# Patient Record
Sex: FEMALE | Race: BLACK OR AFRICAN AMERICAN | NOT HISPANIC OR LATINO | Employment: FULL TIME | URBAN - METROPOLITAN AREA
[De-identification: names, ages, dates, MRNs, and addresses within clinical notes are randomized per-mention and may not be internally consistent; named-entity substitution may affect disease eponyms.]

---

## 2019-05-08 ENCOUNTER — OCCUPATIONAL HEALTH (OUTPATIENT)
Dept: URGENT CARE | Facility: CLINIC | Age: 29
End: 2019-05-08

## 2019-05-08 DIAGNOSIS — Z02.1 PRE-EMPLOYMENT EXAMINATION: Primary | ICD-10-CM

## 2021-07-29 DIAGNOSIS — U07.1 COVID-19 VIRUS DETECTED: ICD-10-CM

## 2021-08-03 ENCOUNTER — NURSE TRIAGE (OUTPATIENT)
Dept: ADMINISTRATIVE | Facility: CLINIC | Age: 31
End: 2021-08-03

## 2023-02-20 ENCOUNTER — TELEPHONE (OUTPATIENT)
Dept: HEMATOLOGY/ONCOLOGY | Facility: CLINIC | Age: 33
End: 2023-02-20
Payer: COMMERCIAL

## 2023-02-22 ENCOUNTER — OFFICE VISIT (OUTPATIENT)
Dept: SURGERY | Facility: CLINIC | Age: 33
End: 2023-02-22
Payer: COMMERCIAL

## 2023-02-22 VITALS
BODY MASS INDEX: 42.14 KG/M2 | HEART RATE: 68 BPM | DIASTOLIC BLOOD PRESSURE: 58 MMHG | TEMPERATURE: 99 F | HEIGHT: 62 IN | SYSTOLIC BLOOD PRESSURE: 114 MMHG | WEIGHT: 229 LBS

## 2023-02-22 DIAGNOSIS — N64.4 PAIN OF LEFT BREAST: Primary | ICD-10-CM

## 2023-02-22 PROCEDURE — 3078F DIAST BP <80 MM HG: CPT | Mod: CPTII,S$GLB,, | Performed by: SURGERY

## 2023-02-22 PROCEDURE — 1160F RVW MEDS BY RX/DR IN RCRD: CPT | Mod: CPTII,S$GLB,, | Performed by: SURGERY

## 2023-02-22 PROCEDURE — 1159F MED LIST DOCD IN RCRD: CPT | Mod: CPTII,S$GLB,, | Performed by: SURGERY

## 2023-02-22 PROCEDURE — 99203 PR OFFICE/OUTPT VISIT, NEW, LEVL III, 30-44 MIN: ICD-10-PCS | Mod: S$GLB,,, | Performed by: SURGERY

## 2023-02-22 PROCEDURE — 1159F PR MEDICATION LIST DOCUMENTED IN MEDICAL RECORD: ICD-10-PCS | Mod: CPTII,S$GLB,, | Performed by: SURGERY

## 2023-02-22 PROCEDURE — 99203 OFFICE O/P NEW LOW 30 MIN: CPT | Mod: S$GLB,,, | Performed by: SURGERY

## 2023-02-22 PROCEDURE — 99999 PR PBB SHADOW E&M-EST. PATIENT-LVL III: CPT | Mod: PBBFAC,,, | Performed by: SURGERY

## 2023-02-22 PROCEDURE — 3074F PR MOST RECENT SYSTOLIC BLOOD PRESSURE < 130 MM HG: ICD-10-PCS | Mod: CPTII,S$GLB,, | Performed by: SURGERY

## 2023-02-22 PROCEDURE — 3078F PR MOST RECENT DIASTOLIC BLOOD PRESSURE < 80 MM HG: ICD-10-PCS | Mod: CPTII,S$GLB,, | Performed by: SURGERY

## 2023-02-22 PROCEDURE — 3008F PR BODY MASS INDEX (BMI) DOCUMENTED: ICD-10-PCS | Mod: CPTII,S$GLB,, | Performed by: SURGERY

## 2023-02-22 PROCEDURE — 3008F BODY MASS INDEX DOCD: CPT | Mod: CPTII,S$GLB,, | Performed by: SURGERY

## 2023-02-22 PROCEDURE — 1160F PR REVIEW ALL MEDS BY PRESCRIBER/CLIN PHARMACIST DOCUMENTED: ICD-10-PCS | Mod: CPTII,S$GLB,, | Performed by: SURGERY

## 2023-02-22 PROCEDURE — 3074F SYST BP LT 130 MM HG: CPT | Mod: CPTII,S$GLB,, | Performed by: SURGERY

## 2023-02-22 PROCEDURE — 99999 PR PBB SHADOW E&M-EST. PATIENT-LVL III: ICD-10-PCS | Mod: PBBFAC,,, | Performed by: SURGERY

## 2023-02-22 NOTE — LETTER
February 22, 2023    Armand Carmona  [address] [facility] [address]         [facility]  [address] [place] [address]  Phone: [phone]  Fax: [phone] February 22, 2023     Patient: Armand Carmona   YOB: 1990   Date of Visit: 2/22/2023       To Whom It May Concern:    Please excuse Ms. Carmona from work on 2/22/2023 as she was under Dr. Barrera's care in clinic. It is my medical opinion that Armand Carmona may return to work on 2/23/2023.    If you have any questions or concerns, please don't hesitate to call.    Sincerely,      Kadie Arevalo RN   Breast Surgery

## 2023-02-22 NOTE — PROGRESS NOTES
Breast Surgery  [facility]  Department of Surgery      REFERRING PROVIDER:  Emergency department    Chief Complaint: Breast Pain (New Patient Left Breast Pain .)      Subjective:      Patient ID: Armand Carmona is a 32 y.o. female who presents with 1 week of left breast pain. Describes the pain as an intermittent throbbing just below her left nipple. Denies any trauma to the area. Denies any discharge from either nipple. Denies skin changes and or masses. Has never had a previous biopsy or breast imaging.    GYN History:  Age of menarche was 10. Premenopausal, LMP January.  Patient denies hormonal therapy. Patient is . Age of first live birth was 18. Family history includes maternal uncle with stomach cancer (60) and maternal uncle with head and neck cancer (67).     Past Medical History:   Diagnosis Date    Fibroid     Shortness of breath      Past Surgical History:   Procedure Laterality Date     SECTION      CHOLECYSTECTOMY      TONSILLECTOMY       Current Outpatient Medications on File Prior to Visit   Medication Sig Dispense Refill    albuterol (PROVENTIL/VENTOLIN HFA) 90 mcg/actuation inhaler Inhale 1-2 puffs into the lungs every 6 (six) hours as needed for Wheezing. Rescue 6.7 g 0    famotidine (PEPCID) 20 MG tablet Take 1 tablet (20 mg total) by mouth 2 (two) times daily. 20 tablet 0    ibuprofen (ADVIL,MOTRIN) 600 MG tablet Take 1 tablet (600 mg total) by mouth every 6 (six) hours as needed for Pain. 20 tablet 0    norgestimate-ethinyl estradiol (ORTHO-CYCLEN) 0.25-35 mg-mcg per tablet Take 1 tablet by mouth once daily. 30 tablet 3     No current facility-administered medications on file prior to visit.     Social History     Socioeconomic History    Marital status: Single   Tobacco Use    Smoking status: Never    Smokeless tobacco: Never   Substance and Sexual Activity    Alcohol use: Yes     Comment: social    Drug use: No    Sexual activity: Yes     Partners: Male     Birth  "control/protection: None     Family History   Problem Relation Age of Onset    Asthma Mother     Hypertension Mother     Thyroid disease Mother     Diabetes Father     Hypertension Father     Arthritis Father     Cancer Sister     No Known Problems Brother         Review of Systems   All other systems reviewed and are negative.  Objective:   BP (!) 114/58 (BP Location: Right arm, Patient Position: Sitting, BP Method: Large (Automatic))   Pulse 68   Temp 98.8 °F (37.1 °C) (Oral)   Ht 5' 2" (1.575 m)   Wt 103.9 kg (229 lb)   LMP 02/15/2023   BMI 41.88 kg/m²     Physical Exam   Vitals reviewed.  Constitutional: She is oriented to person, place, and time.   HENT:   Head: Normocephalic.   Cardiovascular:  Normal rate.      Exam reveals no gallop.       No murmur heard.  Pulmonary/Chest: Effort normal. No respiratory distress. She has no wheezes. Right breast exhibits no inverted nipple, no mass, no nipple discharge, no skin change and no tenderness. Left breast exhibits no inverted nipple, no mass, no nipple discharge, no skin change and no tenderness.   L breast TTP just below L nipple    Abdominal: Normal appearance.   Lymphadenopathy:     She has no cervical adenopathy.        Right: No supraclavicular adenopathy present.        Left: No supraclavicular adenopathy present.   Neurological: She is alert and oriented to person, place, and time.   Skin: Skin is warm and dry.     Psychiatric: Her behavior is normal. Mood normal.     Radiology review: Images personally reviewed by me in the clinic and shown to the patient during the consultation.     Assessment:       1. Pain of left breast          Plan:     There were no signs of mass or infection at the site of the breast pain.  No concerning changes on exam.  Patient has never had breast imaging of this area.  Recommend diagnostic breast imaging to assess for underlying cause of the breast pain.    We discussed breast pain in detail.  Breast pain is the most " common complaint in women have about the breast.  If no underlying cause of the breast pain is seen on imaging then the pain may be multif factorial.   We discussed that breast pain can often be associated to changes in hormones and can characterized either cyclical or noncyclical.    We discussed other nonhormone related causes of breast pain.  These include any injuries to the skin or muscles in the region as well as pain from any injury the breast itself may experience.    Treatment for her breast pain is as follows:  General recommendations:   I recommend she wear a well fitted bra.  When exercising a sports bra is recommended.   For pain relief she can take Tylenol or ibuprofen as recommended on the bottle.   Other recommendations:   Some women have found certain changes in their diet may be helpful to decrease the breast pain.  This topic is not well studied in the literature but patients have anatomically reported improvement of their pain with these dietary changes.  Caffeine has been thought to be a trigger in some cases of breast pain.  She may try eliminating caffeine from her diet to see if this improves the breast pain.  A diet high in fat has also been found to be associated with increased breast pain in certain patients.  She may attempt decreasing the fat in her diet to see if she has any improvement in her pain.    Current plan:  Return to clinic for diagnostic breast imaging.  We will visit after the imaging to discuss potential etiologies of the pain if not are found on the imaging.    30 minutes were spent on this encounter, 25 of which was face to face counseling and 5 minutes were spent on chart review and coordination of care.

## 2023-02-23 ENCOUNTER — TELEPHONE (OUTPATIENT)
Dept: SURGERY | Facility: CLINIC | Age: 33
End: 2023-02-23
Payer: COMMERCIAL

## 2023-02-27 ENCOUNTER — HOSPITAL ENCOUNTER (OUTPATIENT)
Dept: RADIOLOGY | Facility: HOSPITAL | Age: 33
Discharge: HOME | End: 2023-02-27
Attending: SURGERY
Payer: COMMERCIAL

## 2023-02-27 DIAGNOSIS — N64.4 PAIN OF LEFT BREAST: ICD-10-CM

## 2023-02-27 PROCEDURE — 77062 MAMMO DIGITAL DIAGNOSTIC BILAT WITH TOMO: ICD-10-PCS | Mod: 26,,, | Performed by: RADIOLOGY

## 2023-02-27 PROCEDURE — 77062 BREAST TOMOSYNTHESIS BI: CPT | Mod: TC

## 2023-02-27 PROCEDURE — 77066 MAMMO DIGITAL DIAGNOSTIC BILAT WITH TOMO: ICD-10-PCS | Mod: 26,,, | Performed by: RADIOLOGY

## 2023-02-27 PROCEDURE — 77066 DX MAMMO INCL CAD BI: CPT | Mod: 26,,, | Performed by: RADIOLOGY

## 2023-02-27 PROCEDURE — 77062 BREAST TOMOSYNTHESIS BI: CPT | Mod: 26,,, | Performed by: RADIOLOGY

## 2023-03-01 ENCOUNTER — OFFICE VISIT (OUTPATIENT)
Dept: SURGERY | Facility: CLINIC | Age: 33
End: 2023-03-01
Payer: COMMERCIAL

## 2023-03-01 VITALS
BODY MASS INDEX: 42.14 KG/M2 | DIASTOLIC BLOOD PRESSURE: 70 MMHG | WEIGHT: 229 LBS | HEIGHT: 62 IN | SYSTOLIC BLOOD PRESSURE: 146 MMHG | HEART RATE: 53 BPM

## 2023-03-01 DIAGNOSIS — N64.4 MASTALGIA IN FEMALE: Primary | ICD-10-CM

## 2023-03-01 DIAGNOSIS — Z12.39 ENCOUNTER FOR SCREENING BREAST EXAMINATION: ICD-10-CM

## 2023-03-01 PROCEDURE — 1159F MED LIST DOCD IN RCRD: CPT | Mod: CPTII,S$GLB,, | Performed by: NURSE PRACTITIONER

## 2023-03-01 PROCEDURE — 99999 PR PBB SHADOW E&M-EST. PATIENT-LVL III: CPT | Mod: PBBFAC,,, | Performed by: NURSE PRACTITIONER

## 2023-03-01 PROCEDURE — 3077F PR MOST RECENT SYSTOLIC BLOOD PRESSURE >= 140 MM HG: ICD-10-PCS | Mod: CPTII,S$GLB,, | Performed by: NURSE PRACTITIONER

## 2023-03-01 PROCEDURE — 3008F PR BODY MASS INDEX (BMI) DOCUMENTED: ICD-10-PCS | Mod: CPTII,S$GLB,, | Performed by: NURSE PRACTITIONER

## 2023-03-01 PROCEDURE — 1159F PR MEDICATION LIST DOCUMENTED IN MEDICAL RECORD: ICD-10-PCS | Mod: CPTII,S$GLB,, | Performed by: NURSE PRACTITIONER

## 2023-03-01 PROCEDURE — 99214 PR OFFICE/OUTPT VISIT, EST, LEVL IV, 30-39 MIN: ICD-10-PCS | Mod: S$GLB,,, | Performed by: NURSE PRACTITIONER

## 2023-03-01 PROCEDURE — 3008F BODY MASS INDEX DOCD: CPT | Mod: CPTII,S$GLB,, | Performed by: NURSE PRACTITIONER

## 2023-03-01 PROCEDURE — 99999 PR PBB SHADOW E&M-EST. PATIENT-LVL III: ICD-10-PCS | Mod: PBBFAC,,, | Performed by: NURSE PRACTITIONER

## 2023-03-01 PROCEDURE — 3078F DIAST BP <80 MM HG: CPT | Mod: CPTII,S$GLB,, | Performed by: NURSE PRACTITIONER

## 2023-03-01 PROCEDURE — 3077F SYST BP >= 140 MM HG: CPT | Mod: CPTII,S$GLB,, | Performed by: NURSE PRACTITIONER

## 2023-03-01 PROCEDURE — 3078F PR MOST RECENT DIASTOLIC BLOOD PRESSURE < 80 MM HG: ICD-10-PCS | Mod: CPTII,S$GLB,, | Performed by: NURSE PRACTITIONER

## 2023-03-01 PROCEDURE — 99214 OFFICE O/P EST MOD 30 MIN: CPT | Mod: S$GLB,,, | Performed by: NURSE PRACTITIONER

## 2023-03-01 NOTE — LETTER
March 1, 2023      [facility]  [facility] [facility] [address]  Phone: [phone]  Fax: [phone]       Patient: Armand Carmona   YOB: 1990  Date of Visit: 03/01/2023    To Whom It May Concern:    Migel Carmona  was at [facility] on 2/18/2023 and 2/22/2023. She may return to work/school on with no restrictions on 2/23/2023. If you have any questions or concerns, or if I can be of further assistance, please do not hesitate to contact me.    Sincerely,    Emiliana Mendes, NP

## 2023-03-01 NOTE — LETTER
March 1, 2023      [facility]  [facility] [facility] [address]  Phone: [phone]  Fax: [phone]       Patient: Armand Carmona   YOB: 1990  Date of Visit: 03/01/2023    To Whom It May Concern:    Migel Carmona  was at [facility] on 03/01/2023. She may return to work/school on 3/2/2023 with no restrictions. If you have any questions or concerns, or if I can be of further assistance, please do not hesitate to contact me.    Sincerely,    Emiliana Mendes, NP

## 2023-03-01 NOTE — PROGRESS NOTES
Breast Surgery  [facility]  Department of Surgery      REFERRING PROVIDER:  Emergency department    Chief Complaint: Follow-up (F/u after mmg/ breast pain)      Subjective:      Patient ID: Armand Carmona is a 32 y.o. female who presents with 1 week of left breast pain. Describes the pain as an intermittent throbbing just below her left nipple. Denies any trauma to the area. Denies any discharge from either nipple. Denies skin changes and or masses. Has never had a previous biopsy or breast imaging.    GYN History:  Age of menarche was 10. Premenopausal, LMP January.    Patient denies hormonal therapy.   Patient is . Age of first live birth was 18. Family history includes maternal uncle with stomach cancer (60) and maternal uncle with head and neck cancer (67).     Interval History:   Patient returns for follow up after imaging. Bilateral diagnostic mammogram is negative. Patient continues with left breast pain today. Reports left breast has stayed the same and itching at the nipple.  Does report improvement with ibuprofen. Denies pain correlates with cycle. Reports pain 8/10. Pain stays for about 2 hours to 3 hours and then resolves.     Past Medical History:   Diagnosis Date    Fibroid     Shortness of breath      Past Surgical History:   Procedure Laterality Date     SECTION      CHOLECYSTECTOMY      TONSILLECTOMY       Current Outpatient Medications on File Prior to Visit   Medication Sig Dispense Refill    albuterol (PROVENTIL/VENTOLIN HFA) 90 mcg/actuation inhaler Inhale 1-2 puffs into the lungs every 6 (six) hours as needed for Wheezing. Rescue 6.7 g 0    famotidine (PEPCID) 20 MG tablet Take 1 tablet (20 mg total) by mouth 2 (two) times daily. 20 tablet 0    norgestimate-ethinyl estradiol (ORTHO-CYCLEN) 0.25-35 mg-mcg per tablet Take 1 tablet by mouth once daily. 30 tablet 3     No current facility-administered medications on file prior to visit.     Social History     Socioeconomic  "History    Marital status: Single   Tobacco Use    Smoking status: Never    Smokeless tobacco: Never   Substance and Sexual Activity    Alcohol use: Yes     Comment: social    Drug use: No    Sexual activity: Yes     Partners: Male     Birth control/protection: None     Family History   Problem Relation Age of Onset    Asthma Mother     Hypertension Mother     Thyroid disease Mother     Diabetes Father     Hypertension Father     Arthritis Father     Cancer Sister     No Known Problems Brother         Review of Systems   All other systems reviewed and are negative.  Objective:   BP (!) 146/70 (BP Location: Right arm, Patient Position: Sitting, BP Method: Large (Automatic))   Pulse (!) 53   Ht 5' 2" (1.575 m)   Wt 103.9 kg (229 lb)   LMP 02/15/2023   BMI 41.88 kg/m²     Physical Exam   Vitals reviewed.  Constitutional: She is oriented to person, place, and time.   HENT:   Head: Normocephalic.   Cardiovascular:  Normal rate.      Exam reveals no gallop.       No murmur heard.  Pulmonary/Chest: Effort normal. No respiratory distress. She has no wheezes. Right breast exhibits no inverted nipple, no mass, no nipple discharge, no skin change and no tenderness. Left breast exhibits no inverted nipple, no mass, no nipple discharge, no skin change and no tenderness.   L breast TTP just below L nipple    Abdominal: Normal appearance.   Lymphadenopathy:     She has no cervical adenopathy.        Right: No supraclavicular adenopathy present.        Left: No supraclavicular adenopathy present.   Neurological: She is alert and oriented to person, place, and time.   Skin: Skin is warm and dry.     Psychiatric: Her behavior is normal. Mood normal.     Radiology review: Images personally reviewed by me in the clinic and shown to the patient during the consultation.     Assessment:       1. Mastalgia in female    2. Encounter for screening breast examination          Plan:   We discussed there was nothing concerning on exam or " imaging. We discussed breast pain in detail.  Breast pain is the most common complaint in women have about the breast.  If no underlying cause of the breast pain is seen on imaging then the pain may be multif factorial. We discussed that breast pain can often be associated to changes in hormones and can characterized either cyclical or noncyclical.  We discussed other non-hormone related causes of breast pain.  These include any injuries to the skin or muscles in the region as well as pain from any injury the breast itself may experience. Also discussed avoidance of stress.     Discussed OTC therapies such as acetaminophen or nonsteroidal anti-inflammatory drugs (NSAIDs). They can both be used to relieve breast pain. Topical NSAIDS such as OTC Diclofenac (Volteran) gel can be used. Other recommendations include use of a supportive bra. Wearing a soft, supportive bra at night prevents the breasts from pulling down on the chest wall. Some women obtain relief from application of warm compresses or ice packs.     Discussed lifestyle recommendations such as decrease or elimination of caffeine. Also low-fat diet, high complex carbohydrate diet has been shown to be effective.      Alternative therapies such as Evening Primerose Oil (EPO) 1000mg twice daily has been found to be helpful for some patients, results are seen after 3-6 months.     Return to clinic in 3 months or as needed for worsening pain not managed with therapies discussed above.     The patient is in agreement with the plan. Questions were encouraged and answered to patient's satisfaction. Armand will call our office with any questions or concerns.

## 2023-06-05 ENCOUNTER — OFFICE VISIT (OUTPATIENT)
Dept: SURGERY | Facility: CLINIC | Age: 33
End: 2023-06-05
Payer: COMMERCIAL

## 2023-06-05 VITALS
DIASTOLIC BLOOD PRESSURE: 65 MMHG | WEIGHT: 224 LBS | HEIGHT: 62 IN | OXYGEN SATURATION: 100 % | BODY MASS INDEX: 41.22 KG/M2 | TEMPERATURE: 98 F | HEART RATE: 47 BPM | SYSTOLIC BLOOD PRESSURE: 144 MMHG

## 2023-06-05 DIAGNOSIS — Z12.39 ENCOUNTER FOR SCREENING BREAST EXAMINATION: ICD-10-CM

## 2023-06-05 DIAGNOSIS — N64.4 PAIN OF LEFT BREAST: Primary | ICD-10-CM

## 2023-06-05 PROCEDURE — 3078F PR MOST RECENT DIASTOLIC BLOOD PRESSURE < 80 MM HG: ICD-10-PCS | Mod: CPTII,S$GLB,, | Performed by: NURSE PRACTITIONER

## 2023-06-05 PROCEDURE — 3077F PR MOST RECENT SYSTOLIC BLOOD PRESSURE >= 140 MM HG: ICD-10-PCS | Mod: CPTII,S$GLB,, | Performed by: NURSE PRACTITIONER

## 2023-06-05 PROCEDURE — 3077F SYST BP >= 140 MM HG: CPT | Mod: CPTII,S$GLB,, | Performed by: NURSE PRACTITIONER

## 2023-06-05 PROCEDURE — 3008F PR BODY MASS INDEX (BMI) DOCUMENTED: ICD-10-PCS | Mod: CPTII,S$GLB,, | Performed by: NURSE PRACTITIONER

## 2023-06-05 PROCEDURE — 3078F DIAST BP <80 MM HG: CPT | Mod: CPTII,S$GLB,, | Performed by: NURSE PRACTITIONER

## 2023-06-05 PROCEDURE — 99999 PR PBB SHADOW E&M-EST. PATIENT-LVL III: ICD-10-PCS | Mod: PBBFAC,,, | Performed by: NURSE PRACTITIONER

## 2023-06-05 PROCEDURE — 99213 OFFICE O/P EST LOW 20 MIN: CPT | Mod: S$GLB,,, | Performed by: NURSE PRACTITIONER

## 2023-06-05 PROCEDURE — 99213 PR OFFICE/OUTPT VISIT, EST, LEVL III, 20-29 MIN: ICD-10-PCS | Mod: S$GLB,,, | Performed by: NURSE PRACTITIONER

## 2023-06-05 PROCEDURE — 99999 PR PBB SHADOW E&M-EST. PATIENT-LVL III: CPT | Mod: PBBFAC,,, | Performed by: NURSE PRACTITIONER

## 2023-06-05 PROCEDURE — 3008F BODY MASS INDEX DOCD: CPT | Mod: CPTII,S$GLB,, | Performed by: NURSE PRACTITIONER

## 2023-06-05 NOTE — PROGRESS NOTES
Breast Surgery  [facility]  Department of Surgery      REFERRING PROVIDER:  Emergency department    Chief Complaint: Follow-up (3 month follow up .)      Subjective:      Patient ID: Armand Carmona is a 32 y.o. female who presents with 1 week of left breast pain. Describes the pain as an intermittent throbbing just below her left nipple. Denies any trauma to the area. Denies any discharge from either nipple. Denies skin changes and or masses. Has never had a previous biopsy or breast imaging.    GYN History:  Age of menarche was 10. Premenopausal, LMP January.    Patient denies hormonal therapy.   Patient is . Age of first live birth was 18. Family history includes maternal uncle with stomach cancer (60) and maternal uncle with head and neck cancer (67).     Interval History:   Patient returns for follow up today. Patient reports left breast pain has improved, sees a difference with decreasing caffeine intake. Does report improvement with ibuprofen. Denies pain correlates with cycle. Patient continues with left breast itching that comes and goes. She denies nipple discharge or nipple changes. Works as a  so is constantly sweating.     Past Medical History:   Diagnosis Date    Fibroid     Shortness of breath      Past Surgical History:   Procedure Laterality Date     SECTION      CHOLECYSTECTOMY      TONSILLECTOMY       Current Outpatient Medications on File Prior to Visit   Medication Sig Dispense Refill    albuterol (PROVENTIL/VENTOLIN HFA) 90 mcg/actuation inhaler Inhale 1-2 puffs into the lungs every 6 (six) hours as needed for Wheezing. Rescue 6.7 g 0    famotidine (PEPCID) 20 MG tablet Take 1 tablet (20 mg total) by mouth 2 (two) times daily. 20 tablet 0    norgestimate-ethinyl estradiol (ORTHO-CYCLEN) 0.25-35 mg-mcg per tablet Take 1 tablet by mouth once daily. 30 tablet 3     No current facility-administered medications on file prior to visit.     Social History  "    Socioeconomic History    Marital status: Single   Tobacco Use    Smoking status: Never    Smokeless tobacco: Never   Substance and Sexual Activity    Alcohol use: Yes     Comment: social    Drug use: No    Sexual activity: Yes     Partners: Male     Birth control/protection: None     Family History   Problem Relation Age of Onset    Asthma Mother     Hypertension Mother     Thyroid disease Mother     Diabetes Father     Hypertension Father     Arthritis Father     Cancer Sister     No Known Problems Brother         Review of Systems   All other systems reviewed and are negative.  Objective:   BP (!) 144/65 (BP Location: Right arm, Patient Position: Sitting, BP Method: Large (Automatic))   Pulse (!) 47   Temp 97.6 °F (36.4 °C) (Oral)   Ht 5' 2" (1.575 m)   Wt 101.6 kg (224 lb)   LMP 05/11/2023   SpO2 100%   BMI 40.97 kg/m²     Physical Exam   Vitals reviewed.  Constitutional: She is oriented to person, place, and time.   HENT:   Head: Normocephalic.   Cardiovascular:  Normal rate.      Exam reveals no gallop.       No murmur heard.  Pulmonary/Chest: Effort normal. No respiratory distress. She has no wheezes. Right breast exhibits no inverted nipple, no mass, no nipple discharge, no skin change and no tenderness. Left breast exhibits tenderness. Left breast exhibits no inverted nipple, no mass, no nipple discharge and no skin change.   TTP of Left nipple without nipple changes or nipple discharge elicited    Abdominal: Normal appearance.   Musculoskeletal: Lymphadenopathy:      Cervical: No cervical adenopathy.      Upper Body:      Right upper body: No supraclavicular or axillary adenopathy.      Left upper body: No supraclavicular or axillary adenopathy.     Neurological: She is alert and oriented to person, place, and time.   Skin: Skin is warm and dry.     Psychiatric: Her behavior is normal. Mood normal.     Radiology review: Images personally reviewed by me in the clinic and shown to the patient " during the consultation.     Assessment:       1. Pain of left breast    2. Encounter for screening breast examination            Plan:   We discussed there was nothing concerning on exam or imaging. We discussed breast pain in detail.  Breast pain is the most common complaint in women have about the breast.  If no underlying cause of the breast pain is seen on imaging then the pain may be multif factorial. We discussed that breast pain can often be associated to changes in hormones and can characterized either cyclical or noncyclical.  We discussed other non-hormone related causes of breast pain.  These include any injuries to the skin or muscles in the region as well as pain from any injury the breast itself may experience. Also discussed avoidance of stress.     Discussed OTC therapies such as acetaminophen or nonsteroidal anti-inflammatory drugs (NSAIDs). They can both be used to relieve breast pain. Topical NSAIDS such as OTC Diclofenac (Volteran) gel can be used. Other recommendations include use of a supportive bra. Wearing a soft, supportive bra at night prevents the breasts from pulling down on the chest wall. Some women obtain relief from application of warm compresses or ice packs.     Discussed lifestyle recommendations such as decrease or elimination of caffeine. Also low-fat diet, high complex carbohydrate diet has been shown to be effective.      Today's exam is unremarkable   Patietn reports pain has resolved. Seeing improveemnt with decrease in caffeine   Patient reports continued episodes left nipple itching without skin changes or nipple discharge   Does report working in warm environment so is switching out bra as needed. Discussed discontinuation of perfumes, dryer sheets, etc   Patient will message in 2 weeks with update   Aquaphor as needed  Encouraged patient to reach out with any nipple changes or nipple discharge    Return to clinic as needed for worsening pain not managed with therapies  discussed above.     The patient is in agreement with the plan. Questions were encouraged and answered to patient's satisfaction. Armand will call our office with any questions or concerns.

## 2023-06-15 PROBLEM — T22.111A FIRST DEGREE BURN OF RIGHT FOREARM: Status: ACTIVE | Noted: 2023-06-15

## 2023-07-06 ENCOUNTER — PATIENT MESSAGE (OUTPATIENT)
Dept: SURGERY | Facility: CLINIC | Age: 33
End: 2023-07-06
Payer: COMMERCIAL

## 2024-03-06 ENCOUNTER — PATIENT MESSAGE (OUTPATIENT)
Dept: RESEARCH | Facility: HOSPITAL | Age: 34
End: 2024-03-06
Payer: COMMERCIAL

## 2024-03-08 ENCOUNTER — TELEPHONE (OUTPATIENT)
Dept: ADMINISTRATIVE | Facility: CLINIC | Age: 34
End: 2024-03-08
Payer: COMMERCIAL

## 2024-03-08 NOTE — PROGRESS NOTES
Spoke to patient today for Post ED Tracker Assessment. Pt is not currently established with primary care and is needing assistance with finding a PCP. I was unable to schedule this in Epic and have sent a secured message to the [facility] staff requesting they reach out to this patient regarding scheduling. Pt had no other needs at this time. Closing encounter.

## 2024-03-13 ENCOUNTER — TELEPHONE (OUTPATIENT)
Dept: PRIMARY CARE CLINIC | Facility: CLINIC | Age: 34
End: 2024-03-13
Payer: COMMERCIAL

## 2024-03-13 NOTE — TELEPHONE ENCOUNTER
----- Message from Suellen Crawford LPN sent at 3/8/2024 11:18 AM CST -----  Regarding: New Patient Appointment  Good Morning,    I was speaking with this patient for Post ED Tracker Assessment and she is requesting a new patient appointment to establish with Primary Care. I was unable to schedule this appointment in [facility]. Can clinic staff please reach out to this patient regarding scheduling?    Thanks in advance!

## 2024-03-25 ENCOUNTER — OFFICE VISIT (OUTPATIENT)
Dept: PRIMARY CARE CLINIC | Facility: CLINIC | Age: 34
End: 2024-03-25
Payer: COMMERCIAL

## 2024-03-25 VITALS
HEART RATE: 66 BPM | BODY MASS INDEX: 44.46 KG/M2 | OXYGEN SATURATION: 100 % | SYSTOLIC BLOOD PRESSURE: 136 MMHG | DIASTOLIC BLOOD PRESSURE: 78 MMHG | RESPIRATION RATE: 18 BRPM | TEMPERATURE: 97 F | HEIGHT: 62 IN | WEIGHT: 241.63 LBS

## 2024-03-25 DIAGNOSIS — R17 ELEVATED BILIRUBIN: ICD-10-CM

## 2024-03-25 DIAGNOSIS — Z76.89 ENCOUNTER TO ESTABLISH CARE: Primary | ICD-10-CM

## 2024-03-25 DIAGNOSIS — E07.9 THYROID MASS: ICD-10-CM

## 2024-03-25 DIAGNOSIS — R63.8 DIFFICULTY MAINTAINING WEIGHT: ICD-10-CM

## 2024-03-25 PROCEDURE — 99999 PR PBB SHADOW E&M-EST. PATIENT-LVL V: CPT | Mod: PBBFAC,,, | Performed by: STUDENT IN AN ORGANIZED HEALTH CARE EDUCATION/TRAINING PROGRAM

## 2024-03-25 PROCEDURE — 99214 OFFICE O/P EST MOD 30 MIN: CPT | Mod: S$PBB,,, | Performed by: STUDENT IN AN ORGANIZED HEALTH CARE EDUCATION/TRAINING PROGRAM

## 2024-03-25 PROCEDURE — 99215 OFFICE O/P EST HI 40 MIN: CPT | Mod: PBBFAC,PN | Performed by: STUDENT IN AN ORGANIZED HEALTH CARE EDUCATION/TRAINING PROGRAM

## 2024-03-25 NOTE — PROGRESS NOTES
"Subjective:       Patient ID: Tiwdenana Carmona is a 33 y.o. female.    Chief Complaint: Establish Care    HPI:  33 y.o. female presents to [facility] to establish care    Acute concerns?: Was concerned with elevation in urobilinogen in past on study in ED. Was also transiently elevated 2 years ago with resolution.    Patient has some concerned for weight.  Feels hard to maintain.    No current diet, has lost over [id number] was on feet more working for [employer]. Now working in restaurant.    No regular exercise at this time. Does walk daily.    Was in weight loss clinic in past without results.    No reliable form of contraception at this time.    Last PCP?: Unknown  Allergies: NKDA  Medical History: None  Medications: None  Surgical History: cholecystectomy, tonsillectomy, c/s  Family History: paternal grandfather stomach cancer, father's twin spinal cancer, paternal uncle stomach cancer, maternal aunt leukemia; lupus in mother's side of family  Social History: Never smoker, EtOH once monthly, no illicits    Screened for HIV in past and negative  Review of Systems   Constitutional:  Negative for chills, diaphoresis, fatigue and fever.   HENT:  Negative for congestion, sinus pressure, sneezing and sore throat.    Respiratory:  Negative for cough and shortness of breath.    Cardiovascular:  Negative for chest pain and palpitations.   Gastrointestinal:  Negative for abdominal pain, diarrhea, nausea and vomiting.   Musculoskeletal:  Negative for arthralgias, joint swelling and myalgias.   Skin:  Negative for rash and wound.   Neurological:  Negative for dizziness, light-headedness and headaches.       Objective:      Vitals:    03/25/24 1535   BP: 136/78   BP Location: Right arm   Patient Position: Sitting   BP Method: Medium (Manual)   Pulse: 66   Resp: 18   Temp: 97.4 °F (36.3 °C)   TempSrc: Temporal   SpO2: 100%   Weight: 109.6 kg (241 lb 10 oz)   Height: 5' 2" (1.575 m)     Physical Exam  Vitals reviewed. " "  Constitutional:       General: She is not in acute distress.     Appearance: Normal appearance. She is not ill-appearing.   HENT:      Head: Normocephalic and atraumatic.   Eyes:      General:         Right eye: No discharge.         Left eye: No discharge.      Conjunctiva/sclera: Conjunctivae normal.   Neck:      Thyroid: Thyroid mass (Bilateral, mobile, ~2cm, medial lower sternocleidomastoid) present. No thyromegaly or thyroid tenderness.     Cardiovascular:      Rate and Rhythm: Normal rate and regular rhythm.      Pulses: Normal pulses.      Heart sounds: No murmur heard.  Pulmonary:      Effort: Pulmonary effort is normal.      Breath sounds: Normal breath sounds.   Musculoskeletal:         General: No deformity.      Cervical back: Neck supple. No rigidity.   Lymphadenopathy:      Cervical: No cervical adenopathy.   Skin:     General: Skin is warm and dry.      Coloration: Skin is not jaundiced.   Neurological:      General: No focal deficit present.      Mental Status: She is alert and oriented to person, place, and time.   Psychiatric:         Mood and Affect: Mood normal.         Behavior: Behavior normal.             Lab Results   Component Value Date     12/03/2021    K 3.8 12/03/2021     12/03/2021    CO2 23 12/03/2021    BUN 11 12/03/2021    CREATININE 0.8 12/03/2021    ANIONGAP 10 12/03/2021     No results found for: "HGBA1C"  No results found for: "BNP", "BNPTRIAGEBLO"    Lab Results   Component Value Date    WBC 4.15 12/03/2021    HGB 13.0 12/03/2021    HCT 40.2 12/03/2021     12/03/2021    GRAN 2.7 12/03/2021    GRAN 64.7 12/03/2021     Lab Results   Component Value Date    CHOL 131 05/19/2011    HDL 45 05/19/2011    LDLCALC 76.8 05/19/2011    TRIG 46 05/19/2011        No current outpatient medications on file.        Assessment:       1. Encounter to establish care    2. BMI 40.0-44.9, adult    3. Difficulty maintaining weight    4. Elevated bilirubin    5. Thyroid mass        "    Plan:       Encounter to establish care  BMI 40.0-44.9, adult  Difficulty maintaining weight  -     Ambulatory referral/consult to Bariatric/Obesity Medicine; Future; Expected date: 04/01/2024  -     TSH; Future; Expected date: 03/25/2024  -     CBC Auto Differential; Future; Expected date: 03/25/2024  -     Cancel: Comprehensive Metabolic Panel; Future; Expected date: 03/25/2024  - Discussed Contrave today's visit, would prefer something more long term as an option without the continued expense  - Will refer to Obesity medicine a this time    Elevated bilirubin  -     Basic Metabolic Panel; Future; Expected date: 03/25/2024  -     HEPATIC FUNCTION PANEL; Future; Expected date: 03/25/2024  - Mild elevation, suspect storage related with cholecystectomy Hx vs. Gilbert's    Thyroid mass  -     US Thyroid; Future; Expected date: 03/25/2024  -     TSH; Future; Expected date: 03/25/2024    RTC in 2 months for PAP

## 2024-05-17 ENCOUNTER — PATIENT OUTREACH (OUTPATIENT)
Dept: ADMINISTRATIVE | Facility: HOSPITAL | Age: 34
End: 2024-05-17
Payer: COMMERCIAL

## 2024-05-17 NOTE — PROGRESS NOTES
Health Maintenance Due   Topic Date Due    Cervical Cancer Screening  Never done    HIV Screening  Never done    COVID-19 Vaccine (3 - 2023-24 season) 09/01/2023        Chart review done.   HM updated.   Immunizations reviewed & updated.   Care Everywhere updated.  [facility] reviewed

## 2024-09-19 ENCOUNTER — PATIENT MESSAGE (OUTPATIENT)
Dept: PRIMARY CARE CLINIC | Facility: CLINIC | Age: 34
End: 2024-09-19
Payer: COMMERCIAL

## 2025-02-12 ENCOUNTER — OFFICE VISIT (OUTPATIENT)
Dept: PRIMARY CARE CLINIC | Facility: CLINIC | Age: 35
End: 2025-02-12
Payer: COMMERCIAL

## 2025-02-12 VITALS
DIASTOLIC BLOOD PRESSURE: 78 MMHG | SYSTOLIC BLOOD PRESSURE: 126 MMHG | TEMPERATURE: 98 F | WEIGHT: 245.38 LBS | BODY MASS INDEX: 45.15 KG/M2 | HEIGHT: 62 IN | OXYGEN SATURATION: 98 % | HEART RATE: 65 BPM

## 2025-02-12 DIAGNOSIS — Z12.4 CERVICAL CANCER SCREENING: ICD-10-CM

## 2025-02-12 DIAGNOSIS — E07.9 THYROID MASS: ICD-10-CM

## 2025-02-12 DIAGNOSIS — Z09 FOLLOW-UP EXAM: Primary | ICD-10-CM

## 2025-02-12 DIAGNOSIS — Z00.00 HEALTHCARE MAINTENANCE: ICD-10-CM

## 2025-02-12 DIAGNOSIS — J10.1 INFLUENZA A: ICD-10-CM

## 2025-02-12 PROCEDURE — 99999 PR PBB SHADOW E&M-EST. PATIENT-LVL IV: CPT | Mod: PBBFAC,,,
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
## 2025-02-12 NOTE — LETTER
February 12, 2025      [facility] [address]  [address]  [place] [address]  Phone: [phone]  Fax: [phone]       Patient: Armand Carmona   YOB: 1990  Date of Visit: 02/12/2025    To Whom It May Concern:    Migel Carmona  was at [facility] on 02/12/2025. The patient may return to work/school on 02/13/2025 with no restrictions. If you have any questions or concerns, or if I can be of further assistance, please do not hesitate to contact me.    Sincerely,    Naomy Lopez NP

## 2025-02-17 ENCOUNTER — RESULTS FOLLOW-UP (OUTPATIENT)
Dept: PRIMARY CARE CLINIC | Facility: CLINIC | Age: 35
End: 2025-02-17

## 2025-02-18 ENCOUNTER — RESULTS FOLLOW-UP (OUTPATIENT)
Dept: PRIMARY CARE CLINIC | Facility: CLINIC | Age: 35
End: 2025-02-18

## 2025-08-26 ENCOUNTER — OFFICE VISIT (OUTPATIENT)
Dept: OBSTETRICS AND GYNECOLOGY | Facility: CLINIC | Age: 35
End: 2025-08-26
Payer: COMMERCIAL

## 2025-08-26 VITALS
SYSTOLIC BLOOD PRESSURE: 148 MMHG | DIASTOLIC BLOOD PRESSURE: 89 MMHG | BODY MASS INDEX: 42.98 KG/M2 | HEIGHT: 62 IN | HEART RATE: 54 BPM | WEIGHT: 233.56 LBS

## 2025-08-26 DIAGNOSIS — Z01.419 WELL WOMAN EXAM WITH ROUTINE GYNECOLOGICAL EXAM: Primary | ICD-10-CM

## 2025-08-26 DIAGNOSIS — N76.0 RECURRENT VAGINITIS: ICD-10-CM

## 2025-08-26 DIAGNOSIS — Z12.4 CERVICAL CANCER SCREENING: ICD-10-CM

## 2025-08-26 DIAGNOSIS — Z11.3 SCREENING FOR STDS (SEXUALLY TRANSMITTED DISEASES): ICD-10-CM

## 2025-08-26 PROCEDURE — 3079F DIAST BP 80-89 MM HG: CPT | Mod: CPTII,S$GLB,,

## 2025-08-26 PROCEDURE — 87624 HPV HI-RISK TYP POOLED RSLT: CPT

## 2025-08-26 PROCEDURE — 3077F SYST BP >= 140 MM HG: CPT | Mod: CPTII,S$GLB,,

## 2025-08-26 PROCEDURE — 1159F MED LIST DOCD IN RCRD: CPT | Mod: CPTII,S$GLB,,

## 2025-08-26 PROCEDURE — 3008F BODY MASS INDEX DOCD: CPT | Mod: CPTII,S$GLB,,

## 2025-08-26 PROCEDURE — 99999 PR PBB SHADOW E&M-EST. PATIENT-LVL III: CPT | Mod: PBBFAC,,,

## 2025-08-26 PROCEDURE — 3044F HG A1C LEVEL LT 7.0%: CPT | Mod: CPTII,S$GLB,,

## 2025-08-26 PROCEDURE — 99385 PREV VISIT NEW AGE 18-39: CPT | Mod: S$GLB,,,

## 2025-08-26 RX ORDER — NYSTATIN 100000 [USP'U]/G
POWDER TOPICAL
Qty: 60 G | Refills: 6 | Status: SHIPPED | OUTPATIENT
Start: 2025-08-26 | End: 2026-08-26

## 2025-08-29 LAB
HPV DNA, HIGH RISK TYPE 16, PCR (OHS): NEGATIVE
HPV DNA, HIGH RISK TYPE 18, PCR (OHS): NEGATIVE
HPV DNA, HIGH RISK TYPE OTHER, PCR (OHS): NEGATIVE

## 2025-09-02 ENCOUNTER — PATIENT MESSAGE (OUTPATIENT)
Dept: OBSTETRICS AND GYNECOLOGY | Facility: CLINIC | Age: 35
End: 2025-09-02
Payer: COMMERCIAL

## 2025-09-02 DIAGNOSIS — B96.89 BV (BACTERIAL VAGINOSIS): Primary | ICD-10-CM

## 2025-09-02 DIAGNOSIS — N76.0 BV (BACTERIAL VAGINOSIS): Primary | ICD-10-CM

## 2025-09-02 RX ORDER — METRONIDAZOLE 500 MG/1
500 TABLET ORAL EVERY 12 HOURS
Qty: 14 TABLET | Refills: 0 | Status: SHIPPED | OUTPATIENT
Start: 2025-09-02 | End: 2025-09-09